# Patient Record
Sex: FEMALE | Race: WHITE | Employment: UNEMPLOYED | URBAN - METROPOLITAN AREA
[De-identification: names, ages, dates, MRNs, and addresses within clinical notes are randomized per-mention and may not be internally consistent; named-entity substitution may affect disease eponyms.]

---

## 2023-07-27 ENCOUNTER — HOSPITAL ENCOUNTER (EMERGENCY)
Age: 38
Discharge: HOME | End: 2023-07-28
Attending: EMERGENCY MEDICINE
Payer: COMMERCIAL

## 2023-07-27 DIAGNOSIS — F41.1 ANXIETY STATE: Primary | ICD-10-CM

## 2023-07-27 DIAGNOSIS — Z76.89 ENCOUNTER FOR PSYCHIATRIC ASSESSMENT: ICD-10-CM

## 2023-07-27 PROCEDURE — 99284 EMERGENCY DEPT VISIT MOD MDM: CPT

## 2023-07-27 ASSESSMENT — ENCOUNTER SYMPTOMS
EYE ITCHING: 0
CHEST TIGHTNESS: 0
ABDOMINAL DISTENTION: 0
APNEA: 0
ABDOMINAL PAIN: 0
EYE DISCHARGE: 0
COLOR CHANGE: 0
BACK PAIN: 0

## 2023-07-27 ASSESSMENT — PAIN - FUNCTIONAL ASSESSMENT: PAIN_FUNCTIONAL_ASSESSMENT: NONE - DENIES PAIN

## 2023-07-28 VITALS
WEIGHT: 163 LBS | RESPIRATION RATE: 16 BRPM | TEMPERATURE: 98.1 F | OXYGEN SATURATION: 100 % | HEIGHT: 63 IN | DIASTOLIC BLOOD PRESSURE: 60 MMHG | SYSTOLIC BLOOD PRESSURE: 122 MMHG | BODY MASS INDEX: 28.88 KG/M2 | HEART RATE: 62 BPM

## 2023-07-28 ASSESSMENT — LIFESTYLE VARIABLES
HOW OFTEN DO YOU HAVE A DRINK CONTAINING ALCOHOL: NEVER
HOW MANY STANDARD DRINKS CONTAINING ALCOHOL DO YOU HAVE ON A TYPICAL DAY: PATIENT DOES NOT DRINK
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
## 2023-07-28 NOTE — DISCHARGE INSTRUCTIONS
Suicide Help Lines With 24 Hour Help Are:   4-739-LJZJTLD   4-407-118-224-068-7407    [facility]  The [address]  Visit website for more information   [phone] [address]  [address] [place] [address] [address], [place], 1801 [facility]  [phone]    [facility]  [address] [facility] 270[address] [facility], [place], [address]  [phone] [facility], 1801 [facility]  [phone]    [facility]  [address], [place], [address]  [phone] 350 [facility], [place], 28[address] [facility],2Nd Floor  [phone]    [facility]  [address], [place], [address]  [phone]    [address]  [address], [place], [address]  [phone]    [facility] 2475 [facility], [facility], 535 [facility]  [phone] 501 [facility], [place], [address]  [phone]    [address]  [address], [place], 1801 [facility]  [phone]    [address]  [address], [place], 2813 [facility],2Nd Floor  [phone] In [address] # [id number], [facility], 1801 [facility]  [phone]    [facility], 535 [facility]  [phone]    [facility]  [address] [place], [address] [facility]  [phone]    [facility] [address], [address]  [phone]    [facility]  905 [facility], [place], [address]  [phone]    [address], [address]  [phone]    [facility], [address] [place] [address], [address]  [phone]    [facility] and [address] [place] [address], [address]  [phone] [facility] 929 [facility], Dangelo Devine, 200 [facility]  [phone]    Recovery Services clinic - [facility] ([facility]) (addiction and/or mental health)  (Office hours 8AM to 4PM) by appointment only   [address], [address] [facility],2Nd Floor  Office [phone] // Fax [phone] [address] [facility] (Kids)  1501 [facility], Dangelo Devine, 200 [facility]  [phone]    [facility] (Kids)   [facility], ariel Devine, [address] [facility],2Nd Floor  [phone]  Pt is to sign up for (Partial Hospitalization Day Treatment Program)   Monday - Friday  8:00 a.m. - 3:00 p.m    [facility] (Kids)  [address] [place] [facility], ariel HooverYudi watson  [phone]    [address] (Kids)  [address] [place] [address] 217, [address]  [phone]    [facility]  1071 [facility],Ground Floor [facility]    [address] [facility]  [phone]  8541, \[facility] [place] [facility]\"" [address], Arlen Thomas, [address] [place] [address]  [phone]  209 [facility] 1 [facility] Arlen Thomas, [address] [place] [address]  [phone] 284 [facility]  [address] [facility]  [phone]    [address] Dex Thomas, 800 [facility]  [phone]    [facility]  [address] [facility],  [address] , Arlen Thomas, 800 [facility]  [phone]    [address] [facility] [address], [facility], 109 [facility]  [phone]    [facility]  [address]  [phone]      [facility] [place]  [address] [facility] [address] [facility], [address] [place] [address]  [phone] 3990 [facility]  [address]  [address]  [phone]    Luis Fontenot, [address] Rain [place], [address] [facility] [address]  [phone]